# Patient Record
Sex: FEMALE | Race: BLACK OR AFRICAN AMERICAN | NOT HISPANIC OR LATINO | Employment: OTHER | RURAL
[De-identification: names, ages, dates, MRNs, and addresses within clinical notes are randomized per-mention and may not be internally consistent; named-entity substitution may affect disease eponyms.]

---

## 2022-02-17 ENCOUNTER — HOSPITAL ENCOUNTER (EMERGENCY)
Facility: HOSPITAL | Age: 77
Discharge: HOME | End: 2022-02-17
Payer: MEDICARE

## 2022-02-17 VITALS
OXYGEN SATURATION: 95 % | TEMPERATURE: 98 F | WEIGHT: 157 LBS | RESPIRATION RATE: 20 BRPM | HEART RATE: 64 BPM | HEIGHT: 64 IN | SYSTOLIC BLOOD PRESSURE: 129 MMHG | BODY MASS INDEX: 26.8 KG/M2 | DIASTOLIC BLOOD PRESSURE: 54 MMHG

## 2022-02-17 DIAGNOSIS — M79.604 RIGHT LEG PAIN: ICD-10-CM

## 2022-02-17 DIAGNOSIS — M79.606 LEG PAIN: ICD-10-CM
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
## 2022-02-17 PROCEDURE — 99284 EMERGENCY DEPT VISIT MOD MDM: CPT | Mod: 25

## 2022-02-17 PROCEDURE — 99282 PR EMERGENCY DEPT VISIT,LEVEL II: ICD-10-PCS | Mod: ,,, | Performed by: NURSE PRACTITIONER

## 2022-02-17 PROCEDURE — 99282 EMERGENCY DEPT VISIT SF MDM: CPT | Mod: ,,, | Performed by: NURSE PRACTITIONER

## 2022-02-17 RX ORDER — BUPROPION HYDROCHLORIDE 300 MG/1
300 TABLET ORAL
COMMUNITY
Start: 2021-09-02

## 2022-02-17 RX ORDER — OXYCODONE AND ACETAMINOPHEN 10; 325 MG/1; MG/1
TABLET ORAL
COMMUNITY
Start: 2021-10-19

## 2022-02-17 RX ORDER — METOLAZONE 2.5 MG/1
2.5 TABLET ORAL
COMMUNITY
Start: 2021-10-19

## 2022-02-17 RX ORDER — OMEPRAZOLE 20 MG/1
20 TABLET, DELAYED RELEASE ORAL
COMMUNITY
Start: 2022-02-07 | End: 2022-03-06

## 2022-02-17 RX ORDER — POTASSIUM CHLORIDE 750 MG/1
20 CAPSULE, EXTENDED RELEASE ORAL
COMMUNITY
Start: 2021-10-21 | End: 2022-04-19

## 2022-02-17 RX ORDER — FUROSEMIDE 80 MG/1
80 TABLET ORAL
COMMUNITY

## 2022-02-17 RX ORDER — DULOXETIN HYDROCHLORIDE 60 MG/1
60 CAPSULE, DELAYED RELEASE ORAL
COMMUNITY
Start: 2021-09-02

## 2022-02-17 RX ORDER — GABAPENTIN 600 MG/1
600 TABLET ORAL
COMMUNITY

## 2022-02-17 RX ORDER — ALBUTEROL SULFATE 90 UG/1
AEROSOL, METERED RESPIRATORY (INHALATION)
COMMUNITY
Start: 2021-05-24

## 2022-02-17 RX ORDER — DICLOFENAC SODIUM 50 MG/1
50 TABLET, DELAYED RELEASE ORAL
COMMUNITY
Start: 2021-10-19

## 2022-02-17 RX ORDER — TIZANIDINE 2 MG/1
TABLET ORAL
COMMUNITY
Start: 2021-10-19

## 2022-02-17 RX ORDER — AMLODIPINE BESYLATE 5 MG/1
TABLET ORAL
COMMUNITY
Start: 2021-02-23

## 2022-02-17 RX ORDER — ALBUTEROL SULFATE 0.83 MG/ML
2.5 SOLUTION RESPIRATORY (INHALATION)
COMMUNITY

## 2022-02-17 RX ORDER — HYDROCHLOROTHIAZIDE 12.5 MG/1
12.5 TABLET ORAL
COMMUNITY

## 2022-02-17 NOTE — ED PROVIDER NOTES
Encounter Date: 2/17/2022       History     Chief Complaint   Patient presents with    Leg Pain     76 year old female presents to the emergency department to be evaluated for right leg pain that began about a month ago. She tripped and fell 1 week ago, and her pain has been worse since then. She said she had an xray about a week ago and was told to follow up with her orthopedist, but she was also concerned that she may have a DVT in her leg. Denies any chest pain or shortness of breath. Her appointment with her orthopedist is 3/8/22 at [facility]. Denies any head injury, headache, neck pain, back pain, loss of consciousness.    The history is provided by the patient.   Leg Pain   The incident occurred several weeks ago. Pertinent negatives include no numbness, no inability to bear weight, no loss of motion, no muscle weakness, no loss of sensation and no tingling.     Review of patient's allergies indicates:   Allergen Reactions    Aspirin     Codeine     Penicillins     Sulfa (sulfonamide antibiotics)      Past Medical History:   Diagnosis Date    Asthma     Cancer     Diabetes mellitus     GERD (gastroesophageal reflux disease)     Hypertension      Past Surgical History:   Procedure Laterality Date    LUNG REMOVAL, PARTIAL      LUNG REMOVAL, TOTAL      3/4 lung removal     Family History   Problem Relation Age of Onset    Cancer Brother      Social History     Tobacco Use    Smoking status: Former Smoker   Substance Use Topics    Alcohol use: Yes     Comment: occasional     Review of Systems   Neurological: Negative for tingling and numbness.   All other systems reviewed and are negative.      Physical Exam     Initial Vitals [02/17/22 1404]   BP Pulse Resp Temp SpO2   (!) 129/54 64 20 97.9 °F (36.6 °C) 95 %      MAP       --         Physical Exam    Vitals reviewed.  Constitutional: She appears well-developed and well-nourished.   Neck: Neck supple.   Cardiovascular: Normal rate and regular  rhythm.   Pulses:       Dorsalis pedis pulses are 2+ on the right side and 2+ on the left side.   Pulmonary/Chest: Breath sounds normal.   Abdominal: Abdomen is soft. Bowel sounds are normal. She exhibits no distension and no mass. There is no abdominal tenderness. There is no rebound and no guarding.   Musculoskeletal:         General: Tenderness (right calf) present. No edema. Normal range of motion.      Cervical back: Normal and neck supple.      Thoracic back: Normal.      Lumbar back: Normal.      Right hip: Normal.      Left hip: Normal.      Right upper leg: Normal.      Left upper leg: Normal.      Right knee: Normal.      Left knee: Normal.      Right lower leg: Normal.      Left lower leg: Normal.      Right ankle: Normal.      Left ankle: Normal.      Right foot: Normal.      Left foot: Normal.     Neurological: She is alert and oriented to person, place, and time. She has normal strength. GCS score is 15. GCS eye subscore is 4. GCS verbal subscore is 5. GCS motor subscore is 6.   Skin: Skin is warm and dry. Capillary refill takes less than 2 seconds.   Psychiatric: She has a normal mood and affect.         Medical Screening Exam   See Full Note    ED Course   Procedures  Labs Reviewed - No data to display       Imaging Results          X-Ray Tibia Fibula 2 View Right (Final result)  Result time 02/17/22 15:52:32    Final result by Jostin Jang DO (02/17/22 15:52:32)                 Impression:      As above.      Electronically signed by: Jostin Jang  Date:    02/17/2022  Time:    15:52             Narrative:    EXAMINATION:  XR FEMUR 2 VIEW RIGHT; XR PELVIS ROUTINE AP; XR TIBIA FIBULA 2 VIEW RIGHT    CLINICAL HISTORY:  right leg pain;Pain in right leg    TECHNIQUE:  XR FEMUR 2 VIEW RIGHT; XR PELVIS ROUTINE AP; XR TIBIA FIBULA 2 VIEW RIGHT    COMPARISON:  Comparisons were reviewed, if available.    FINDINGS:  Questionable erosive changes of the right iliac bone relative to the left.  If  there is a palpable mass in this region then CT is recommended.    Severe osteoarthrosis of the hips.    No acute fracture or dislocation.  No concerning periostitis.                               X-Ray Femur Ap/Lat Right (Final result)  Result time 02/17/22 15:52:32    Final result by Jostin Jang DO (02/17/22 15:52:32)                 Impression:      As above.      Electronically signed by: Jostin Jang  Date:    02/17/2022  Time:    15:52             Narrative:    EXAMINATION:  XR FEMUR 2 VIEW RIGHT; XR PELVIS ROUTINE AP; XR TIBIA FIBULA 2 VIEW RIGHT    CLINICAL HISTORY:  right leg pain;Pain in right leg    TECHNIQUE:  XR FEMUR 2 VIEW RIGHT; XR PELVIS ROUTINE AP; XR TIBIA FIBULA 2 VIEW RIGHT    COMPARISON:  Comparisons were reviewed, if available.    FINDINGS:  Questionable erosive changes of the right iliac bone relative to the left.  If there is a palpable mass in this region then CT is recommended.    Severe osteoarthrosis of the hips.    No acute fracture or dislocation.  No concerning periostitis.                               X-Ray Pelvis Routine AP (Final result)  Result time 02/17/22 15:52:32    Final result by Jostin Jang DO (02/17/22 15:52:32)                 Impression:      As above.      Electronically signed by: Jostin Jang  Date:    02/17/2022  Time:    15:52             Narrative:    EXAMINATION:  XR FEMUR 2 VIEW RIGHT; XR PELVIS ROUTINE AP; XR TIBIA FIBULA 2 VIEW RIGHT    CLINICAL HISTORY:  right leg pain;Pain in right leg    TECHNIQUE:  XR FEMUR 2 VIEW RIGHT; XR PELVIS ROUTINE AP; XR TIBIA FIBULA 2 VIEW RIGHT    COMPARISON:  Comparisons were reviewed, if available.    FINDINGS:  Questionable erosive changes of the right iliac bone relative to the left.  If there is a palpable mass in this region then CT is recommended.    Severe osteoarthrosis of the hips.    No acute fracture or dislocation.  No concerning periostitis.                               US Lower Extremity  Veins Right (Final result)  Result time 02/17/22 15:39:35    Final result by Jostin Jang DO (02/17/22 15:39:35)                 Impression:      As above.      Electronically signed by: Jostin Jang  Date:    02/17/2022  Time:    15:39             Narrative:    EXAMINATION:  US LOWER EXTREMITY VEINS RIGHT    CLINICAL HISTORY:  Pain in leg, unspecified    TECHNIQUE:  US LOWER EXTREMITY VEINS RIGHT    COMPARISON:  Comparisons were reviewed, if available.    FINDINGS:  No DVT                                 Medications - No data to display                    Clinical Impression:   Final diagnoses:  [M79.606] Leg pain  [M79.604] Right leg pain  [W19.XXXA] Fall, initial encounter (Primary)          ED Disposition Condition    Discharge Stable        ED Prescriptions     None        Follow-up Information    None          JESÚS Parker  02/17/22 [id number]

## 2022-02-17 NOTE — DISCHARGE INSTRUCTIONS
Follow up with your orthopedist as scheduled. Follow up with your primary care provider in 2 days. Return to the emergency department for any increase in symptoms or for any other new or worrisome symptoms.